# Patient Record
Sex: FEMALE | Race: WHITE | NOT HISPANIC OR LATINO | Employment: STUDENT | URBAN - METROPOLITAN AREA
[De-identification: names, ages, dates, MRNs, and addresses within clinical notes are randomized per-mention and may not be internally consistent; named-entity substitution may affect disease eponyms.]

---

## 2023-06-28 ENCOUNTER — OFFICE VISIT (OUTPATIENT)
Dept: PEDIATRICS | Facility: CLINIC | Age: 18
End: 2023-06-28
Payer: COMMERCIAL

## 2023-06-28 ENCOUNTER — APPOINTMENT (OUTPATIENT)
Dept: PEDIATRICS | Facility: CLINIC | Age: 18
End: 2023-06-28
Payer: COMMERCIAL

## 2023-06-28 VITALS
DIASTOLIC BLOOD PRESSURE: 72 MMHG | SYSTOLIC BLOOD PRESSURE: 122 MMHG | HEIGHT: 68 IN | BODY MASS INDEX: 23.49 KG/M2 | WEIGHT: 155 LBS

## 2023-06-28 DIAGNOSIS — F41.9 ANXIETY DISORDER, UNSPECIFIED TYPE: Primary | ICD-10-CM

## 2023-06-28 PROBLEM — R10.32 INTERMITTENT LEFT LOWER QUADRANT ABDOMINAL PAIN: Status: ACTIVE | Noted: 2023-06-28

## 2023-06-28 PROBLEM — M41.125 ADOLESCENT IDIOPATHIC SCOLIOSIS OF THORACOLUMBAR REGION: Status: ACTIVE | Noted: 2023-06-28

## 2023-06-28 PROBLEM — M76.51 PATELLAR TENDINITIS OF BOTH KNEES: Status: ACTIVE | Noted: 2023-06-28

## 2023-06-28 PROBLEM — N94.6 DYSMENORRHEA: Status: ACTIVE | Noted: 2023-06-28

## 2023-06-28 PROBLEM — R51.9 GENERALIZED HEADACHES: Status: ACTIVE | Noted: 2023-06-28

## 2023-06-28 PROBLEM — F41.1 GENERALIZED ANXIETY DISORDER: Status: ACTIVE | Noted: 2023-06-28

## 2023-06-28 PROBLEM — M76.52 PATELLAR TENDINITIS OF BOTH KNEES: Status: ACTIVE | Noted: 2023-06-28

## 2023-06-28 PROBLEM — L70.9 ACNE: Status: ACTIVE | Noted: 2023-06-28

## 2023-06-28 PROCEDURE — 99214 OFFICE O/P EST MOD 30 MIN: CPT | Performed by: NURSE PRACTITIONER

## 2023-06-28 RX ORDER — NORGESTIMATE AND ETHINYL ESTRADIOL 0.25-0.035
1 KIT ORAL DAILY
COMMUNITY
End: 2023-08-04 | Stop reason: SDUPTHER

## 2023-06-28 RX ORDER — FLUOXETINE 10 MG/1
TABLET ORAL
Qty: 60 TABLET | Refills: 0 | Status: SHIPPED | OUTPATIENT
Start: 2023-06-28 | End: 2023-11-02

## 2023-06-28 NOTE — PROGRESS NOTES
"Subjective   Patient ID: Michaela Matos is a 18 y.o. female who presents for No chief complaint on file..  Today she is accompanied by alone.     HPI: Michaela Matos is here today for anxiety  History of anxiety  Was prescribed anxiety medication in the past, but had anxiety about taking the medication, so never did  Always thought it was the birth control that was making her anxious but has tried several different birth control pills without improvement   Nearly every day she reports feeling anxious/nervous or on edge, not able to stop or control worrying, worries too much about different things, has trouble relaxing and afraid something awful might happen  Finds herself feeling nervous about talking in front of people, feels like she is being judged although knows she probably isnt   Feels like anxiety got worse after covid   Has tried some behavior modifications that hasn't really helped much  Found counselor, waiting for call back to start talk therapy     Sometimes feels sad, but denies any SI/HI or feeling like the things she enjoyed are not fun anymore or difficulty getting out of bed    Eating and sleeping well   Has boyfriend- going well  over the summer is working for [employer] in [place]      Review of systems is otherwise negative unless stated above or in history of present illness.    Objective   /72   Ht 1.721 m (5' 7.75\")   Wt 70.3 kg (155 lb)   BMI 23.74 kg/m²   BSA: 1.83 meters squared  Growth percentiles: Facility age limit for growth %lenin is 20 years. Facility age limit for growth %lenin is 20 years.     Physical Exam  Vitals and nursing note reviewed.   Constitutional:       Appearance: Normal appearance. She is normal weight.   HENT:      Mouth/Throat:      Mouth: Mucous membranes are moist.      Pharynx: Oropharynx is clear.   Eyes:      Extraocular Movements: Extraocular movements intact.      Conjunctiva/sclera: Conjunctivae normal.      Pupils: Pupils are equal, round, and reactive to " light.   Cardiovascular:      Rate and Rhythm: Normal rate and regular rhythm.      Pulses: Normal pulses.      Heart sounds: Normal heart sounds.   Pulmonary:      Effort: Pulmonary effort is normal.      Breath sounds: Normal breath sounds.   Abdominal:      General: Abdomen is flat. Bowel sounds are normal.      Palpations: Abdomen is soft.   Musculoskeletal:         General: Normal range of motion.      Cervical back: Normal range of motion.   Skin:     General: Skin is warm and dry.   Neurological:      General: No focal deficit present.      Mental Status: She is alert and oriented to person, place, and time. Mental status is at baseline.   Psychiatric:         Mood and Affect: Mood normal.         Behavior: Behavior normal.         Thought Content: Thought content normal.         Judgment: Judgment normal.       Assessment/Plan   Michaela Matos was seen today for anxiety/depression consult  Based on history and questionnaires appears more anxiety than depression  Was prescribed Sertraline 25 mg in the past  Will trial Fluoxetine 10 mg and can increase to 20 mg in 1-2 weeks if no improvement of symptoms  Discussed may take several months to notice improvement   Follow up with counselor for behavioral health therapy  Follow up in 1 month for next medication check, sooner if needed     Milagros Walker, CNP

## 2023-06-30 ENCOUNTER — APPOINTMENT (OUTPATIENT)
Dept: PEDIATRICS | Facility: CLINIC | Age: 18
End: 2023-06-30
Payer: COMMERCIAL

## 2023-08-04 ENCOUNTER — OFFICE VISIT (OUTPATIENT)
Dept: PEDIATRICS | Facility: CLINIC | Age: 18
End: 2023-08-04
Payer: COMMERCIAL

## 2023-08-04 VITALS
BODY MASS INDEX: 23.04 KG/M2 | WEIGHT: 152 LBS | SYSTOLIC BLOOD PRESSURE: 120 MMHG | DIASTOLIC BLOOD PRESSURE: 76 MMHG | HEIGHT: 68 IN

## 2023-08-04 DIAGNOSIS — F41.1 GENERALIZED ANXIETY DISORDER: Primary | ICD-10-CM

## 2023-08-04 DIAGNOSIS — F41.9 ANXIETY, MILD: ICD-10-CM

## 2023-08-04 DIAGNOSIS — N94.6 DYSMENORRHEA: ICD-10-CM

## 2023-08-04 DIAGNOSIS — M41.125 ADOLESCENT IDIOPATHIC SCOLIOSIS OF THORACOLUMBAR REGION: ICD-10-CM

## 2023-08-04 PROBLEM — L70.9 ACNE: Status: RESOLVED | Noted: 2023-06-28 | Resolved: 2023-08-04

## 2023-08-04 PROBLEM — M76.52 PATELLAR TENDINITIS OF BOTH KNEES: Status: RESOLVED | Noted: 2023-06-28 | Resolved: 2023-08-04

## 2023-08-04 PROBLEM — R10.32 INTERMITTENT LEFT LOWER QUADRANT ABDOMINAL PAIN: Status: RESOLVED | Noted: 2023-06-28 | Resolved: 2023-08-04

## 2023-08-04 PROBLEM — M76.51 PATELLAR TENDINITIS OF BOTH KNEES: Status: RESOLVED | Noted: 2023-06-28 | Resolved: 2023-08-04

## 2023-08-04 PROBLEM — R51.9 GENERALIZED HEADACHES: Status: RESOLVED | Noted: 2023-06-28 | Resolved: 2023-08-04

## 2023-08-04 PROCEDURE — 99213 OFFICE O/P EST LOW 20 MIN: CPT | Performed by: NURSE PRACTITIONER

## 2023-08-04 RX ORDER — NORGESTIMATE AND ETHINYL ESTRADIOL 0.25-0.035
1 KIT ORAL DAILY
Qty: 90 TABLET | Refills: 2 | Status: SHIPPED | OUTPATIENT
Start: 2023-08-04 | End: 2024-04-19

## 2023-08-04 RX ORDER — FLUOXETINE HYDROCHLORIDE 20 MG/1
20 CAPSULE ORAL DAILY
Qty: 90 CAPSULE | Refills: 0 | Status: SHIPPED | OUTPATIENT
Start: 2023-08-04 | End: 2023-11-02 | Stop reason: SDUPTHER

## 2023-08-04 RX ORDER — NORGESTIMATE AND ETHINYL ESTRADIOL 0.25-0.035
1 KIT ORAL DAILY
Qty: 28 TABLET | Refills: 5 | OUTPATIENT
Start: 2023-08-04

## 2023-08-04 RX ORDER — FLUOXETINE 10 MG/1
10 TABLET ORAL DAILY
Qty: 90 TABLET | Refills: 0 | Status: SHIPPED | OUTPATIENT
Start: 2023-08-04 | End: 2023-11-02 | Stop reason: SDUPTHER

## 2023-08-04 NOTE — PROGRESS NOTES
"Subjective   Patient ID: Michaela aMtos is a 18 y.o. female who presents for Medication Visit.  Today she is accompanied by alone.     HPI: Michaela Matos is here today for anxiety medication check  Started on Fluoxetine last month for history of anxiety  Currently on Fluoxetine 20 mg daily  Notes slight change, but not much   Started seeing a counselor Danuta Kimball in [place], only saw once   Will be going back to [place] on the 24th of August  This year will be living in an apartment with 3 other roommates  Still with boyfriend, things going well  Working one more week before she leaves for school   Does note still nearly every day feeling nervous, anxious, on edge and being so restless that its hard to sit still  Over half the days feels not able to stop or control worrying, worries too much about different things, trouble relaxing and feeling afraid something awful might happen  Did have on instance since starting medication that she felt a lot of anxiety where she was at restaurant with boyfriend and his family when cook called her out about her order.   Sleeping and eating ok  No headaches, upset stomach, etc  No SI/HI    Please see attached questionnaires     Review of systems is otherwise negative unless stated above or in history of present illness.    Objective   /76   Ht 1.721 m (5' 7.76\")   Wt 68.9 kg (152 lb)   BMI 23.28 kg/m²   BSA: 1.81 meters squared  Growth percentiles: Facility age limit for growth %lenin is 20 years. Facility age limit for growth %lenin is 20 years.     Physical Exam  Vitals and nursing note reviewed.   Constitutional:       Appearance: Normal appearance. She is normal weight.   HENT:      Right Ear: Tympanic membrane, ear canal and external ear normal.      Left Ear: Tympanic membrane, ear canal and external ear normal.      Nose: Nose normal.      Mouth/Throat:      Mouth: Mucous membranes are moist.      Pharynx: Oropharynx is clear.   Eyes:      " Conjunctiva/sclera: Conjunctivae normal.      Pupils: Pupils are equal, round, and reactive to light.   Cardiovascular:      Rate and Rhythm: Normal rate and regular rhythm.      Pulses: Normal pulses.      Heart sounds: Normal heart sounds.   Pulmonary:      Effort: Pulmonary effort is normal.      Breath sounds: Normal breath sounds.   Abdominal:      General: Abdomen is flat. Bowel sounds are normal.      Palpations: Abdomen is soft.   Musculoskeletal:         General: Normal range of motion.      Cervical back: Normal range of motion.   Skin:     General: Skin is warm and dry.   Neurological:      Mental Status: She is alert.   Psychiatric:         Mood and Affect: Mood normal.         Behavior: Behavior normal.         Thought Content: Thought content normal.         Judgment: Judgment normal.         Assessment/Plan   Michaela Matos was seen today for anxiety medication check  Mild improvement with Fluoxetine 20 mg   Will increase to Fluoxetine 30 mg daily  Behavior modifications discussed  Continue seeing counselor- recommended virtual while away at school   Return in 3 months for next medication check- may be virtual since she is at school     Birth control also resent to pharmacy for 3 month prescription at a time since she is going back to school     Milagros Walker, CNP

## 2023-11-02 ENCOUNTER — OFFICE VISIT (OUTPATIENT)
Dept: PEDIATRICS | Facility: CLINIC | Age: 18
End: 2023-11-02
Payer: COMMERCIAL

## 2023-11-02 VITALS
WEIGHT: 157 LBS | HEIGHT: 68 IN | DIASTOLIC BLOOD PRESSURE: 62 MMHG | SYSTOLIC BLOOD PRESSURE: 110 MMHG | BODY MASS INDEX: 23.79 KG/M2

## 2023-11-02 DIAGNOSIS — F41.1 GENERALIZED ANXIETY DISORDER: Primary | ICD-10-CM

## 2023-11-02 DIAGNOSIS — F41.0 PANIC ATTACKS: ICD-10-CM

## 2023-11-02 PROCEDURE — 99213 OFFICE O/P EST LOW 20 MIN: CPT | Performed by: PEDIATRICS

## 2023-11-02 RX ORDER — FLUOXETINE HYDROCHLORIDE 20 MG/1
20 CAPSULE ORAL DAILY
Qty: 90 CAPSULE | Refills: 0 | Status: SHIPPED | OUTPATIENT
Start: 2023-11-02 | End: 2024-03-13 | Stop reason: SDUPTHER

## 2023-11-02 RX ORDER — FLUOXETINE 10 MG/1
10 TABLET ORAL DAILY
Qty: 90 TABLET | Refills: 0 | Status: SHIPPED | OUTPATIENT
Start: 2023-11-02 | End: 2024-03-13 | Stop reason: SDUPTHER

## 2023-11-02 ASSESSMENT — ENCOUNTER SYMPTOMS: NERVOUS/ANXIOUS: 1

## 2023-11-02 NOTE — PROGRESS NOTES
Subjective   Patient ID: Michaela Matos is a 18 y.o. female who presents for med check.    Michaela Matos is here today for anxiety medication check. She is currently at [employer] in her chuck year to become an . She has been on Fluoxetine 30 mg since August and it has helped her a lot. Her anxiety is minimal but she gets episodes 2-4 times a day where her heart races , it lasts for a couple of minutes. Never bothers her at night   This year is  living in an apartment with 3 other roommates  Still with boyfriend, things going well  Sleeping and eating ok  No headaches, upset stomach, etc  No SI/HI. Pl see attached questionnaire. She has not seen a counselor consistently.           Review of Systems   Psychiatric/Behavioral:  The patient is nervous/anxious.        Objective   Physical Exam  Vitals and nursing note reviewed. Exam conducted with a chaperone present.   Constitutional:       Appearance: Normal appearance.   HENT:      Head: Normocephalic and atraumatic.      Right Ear: External ear normal.      Left Ear: External ear normal.      Nose: Nose normal.      Mouth/Throat:      Mouth: Mucous membranes are moist.   Eyes:      Extraocular Movements: Extraocular movements intact.      Conjunctiva/sclera: Conjunctivae normal.      Pupils: Pupils are equal, round, and reactive to light.   Cardiovascular:      Rate and Rhythm: Normal rate and regular rhythm.      Heart sounds: Normal heart sounds.   Pulmonary:      Effort: Pulmonary effort is normal.      Breath sounds: Normal breath sounds.   Musculoskeletal:      Cervical back: Normal range of motion and neck supple.   Skin:     General: Skin is warm and dry.   Neurological:      General: No focal deficit present.      Mental Status: She is alert and oriented to person, place, and time.   Psychiatric:         Mood and Affect: Mood normal.         Behavior: Behavior normal.         Assessment/Plan   Diagnoses and all orders for this  visit:  Generalized anxiety disorder  Panic attacks  Comments:  mild  -     FLUoxetine (PROzac) 10 mg tablet; Take 1 tablet (10 mg) by mouth once daily.  -     FLUoxetine (PROzac) 20 mg capsule; Take 1 capsule (20 mg) by mouth once daily.  Michaela was asked to eat 3 balanced meals a day, maintain a good sleep schedule ( at least 8 hours of sleep each night), exercise daily and have fun doing it. Behavior strategies were discussed including talking about feelings on a daily basis, squashing every negative thought or feeling by a positive one, not over thinking or self judging treating yourself like a best friend, taking time each day to refresh and renew, smiling often, surrounding yourself by people who support you, consider mistakes as part of progress and learning from them, make small goals and celebrate progress, don't dwell on the past, let go of grudges, mistakes and bitterness. Think of each day as a gift and make the most of it, find ways to relax- deep breathing, yoga, meditation, music etc and appreciate yourself.  She was continued on her current medication. She was asked to see a counselor on a regular basis. She will return in 3 months or sooner if symptoms worsen or persist.

## 2024-03-13 ENCOUNTER — OFFICE VISIT (OUTPATIENT)
Dept: PEDIATRICS | Facility: CLINIC | Age: 19
End: 2024-03-13
Payer: COMMERCIAL

## 2024-03-13 VITALS
TEMPERATURE: 97.4 F | DIASTOLIC BLOOD PRESSURE: 68 MMHG | BODY MASS INDEX: 24.94 KG/M2 | SYSTOLIC BLOOD PRESSURE: 110 MMHG | WEIGHT: 164 LBS

## 2024-03-13 DIAGNOSIS — R11.2 NAUSEA AND VOMITING, UNSPECIFIED VOMITING TYPE: ICD-10-CM

## 2024-03-13 DIAGNOSIS — F41.1 GENERALIZED ANXIETY DISORDER: Primary | ICD-10-CM

## 2024-03-13 PROCEDURE — 87636 SARSCOV2 & INF A&B AMP PRB: CPT

## 2024-03-13 PROCEDURE — 99214 OFFICE O/P EST MOD 30 MIN: CPT | Performed by: NURSE PRACTITIONER

## 2024-03-13 RX ORDER — FLUOXETINE HYDROCHLORIDE 20 MG/1
20 CAPSULE ORAL DAILY
Qty: 90 CAPSULE | Refills: 0 | Status: SHIPPED | OUTPATIENT
Start: 2024-03-13 | End: 2024-06-11

## 2024-03-13 RX ORDER — FLUOXETINE 10 MG/1
10 TABLET ORAL DAILY
Qty: 90 TABLET | Refills: 0 | Status: SHIPPED | OUTPATIENT
Start: 2024-03-13 | End: 2024-06-11

## 2024-03-13 RX ORDER — FLUOXETINE 10 MG/1
10 CAPSULE ORAL DAILY
COMMUNITY
Start: 2023-09-28 | End: 2024-03-13 | Stop reason: SDUPTHER

## 2024-03-13 RX ORDER — ONDANSETRON 4 MG/1
4 TABLET, ORALLY DISINTEGRATING ORAL EVERY 8 HOURS PRN
Qty: 20 TABLET | Refills: 0 | Status: SHIPPED | OUTPATIENT
Start: 2024-03-13 | End: 2024-03-20

## 2024-03-13 NOTE — PROGRESS NOTES
"Subjective   Patient ID: Michaela Matos is a 19 y.o. female who presents for Vomiting.  Today she is accompanied by accompanied by grandmother.     HPI: Michaela Matos is here today for vomiting  and anxiety medication check     Vomiting  Started last night   Multiple episodes   Unable to keep anything down   Yesterday ate yogurt, pasta/chicken, sub sandwich   Some abdominal pain with throwing up - more cramping   No fevers, sore throat or diarrhea   Mom sick with fevers/chills and cough at home     History of anxiety  Currently on Prozac 30 mg daily   Yesterday had a panic attack- but feels that was  because she was throwing up, vomited/got some air and felt better   No other panic attacks since last medication check   Doesn't feel like medication is doing as much as it did for her when she first started taking - but doesn't want to increase the dose   At [employer] studying early education  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, especially math   Has all As this semester   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 it   Will graduate in fall 2026  Still with boyfriend - relationship going really well   Coming home over the summer and will be working at [employer] 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    Objective   /68   Temp 36.3 °C (97.4 °F)   Wt 74.4 kg (164 lb)   BMI 24.94 " kg/m²   BSA: 1.89 meters squared  Growth percentiles: Facility age limit for growth %lenin is 20 years. Facility age limit for growth %lenin is 20 years.     Physical Exam  Vitals and nursing note reviewed.   Constitutional:       Appearance: Normal appearance. She is ill-appearing.   HENT:      Head: Normocephalic.      Right Ear: Tympanic membrane, ear canal and external ear normal.      Left Ear: Tympanic membrane, ear canal and external ear normal.      Nose: Nose normal.      Mouth/Throat:      Mouth: Mucous membranes are moist.      Pharynx: Oropharynx is clear.   Eyes:      Conjunctiva/sclera: Conjunctivae normal.      Pupils: Pupils are equal, round, and reactive to light.   Cardiovascular:      Rate and Rhythm: Normal rate and regular rhythm.      Pulses: Normal pulses.      Heart sounds: Normal heart sounds.   Pulmonary:      Effort: Pulmonary effort is normal.      Breath sounds: Normal breath sounds.   Abdominal:      General: Abdomen is flat. Bowel sounds are normal.      Palpations: Abdomen is soft.   Musculoskeletal:         General: Normal range of motion.      Cervical back: Normal range of motion.   Skin:     General: Skin is warm and dry.   Neurological:      General: No focal deficit present.      Mental Status: She is alert and oriented to person, place, and time. Mental status is at baseline.   Psychiatric:         Mood and Affect: Mood normal.         Behavior: Behavior normal.         Assessment/Plan   Michaela Matos was seen today for vomiting and anxiety medication check    Vomiting   Abdomen soft and non tender   Covid/flu pending and will only call if results are positive  Likely viral gastro  Trial Zofran PRN for nausea  Continue symptomatic treatment with rest, fluids, bland diet, etc  She will call if symptoms worsen or persist    Anxiety   Overall doing well on Prozac  Will refill Prozac 30 mg daily  Behavioral modifications discussed  Recommended reaching out to [facility]  counseling services for behavioral health therapy [phone]  Transition to adult medicine - list provided   (She will return for medication check in 3 months if she cannot get in with a new provider)    Milagros Walker, CNP     Statement Selected

## 2024-03-14 LAB
FLUAV RNA RESP QL NAA+PROBE: NOT DETECTED
FLUBV RNA RESP QL NAA+PROBE: NOT DETECTED
SARS-COV-2 RNA RESP QL NAA+PROBE: NOT DETECTED

## 2024-04-15 DIAGNOSIS — F41.0 PANIC ATTACKS: ICD-10-CM

## 2024-04-15 DIAGNOSIS — N94.6 DYSMENORRHEA: ICD-10-CM

## 2024-04-15 DIAGNOSIS — F41.9 ANXIETY, MILD: Primary | ICD-10-CM

## 2024-04-19 RX ORDER — FLUOXETINE 10 MG/1
10 CAPSULE ORAL DAILY
Qty: 30 CAPSULE | Refills: 2 | Status: SHIPPED | OUTPATIENT
Start: 2024-04-19

## 2024-04-19 RX ORDER — NORGESTIMATE AND ETHINYL ESTRADIOL 0.25-0.035
1 KIT ORAL DAILY
Qty: 28 TABLET | Refills: 8 | Status: SHIPPED | OUTPATIENT
Start: 2024-04-19 | End: 2025-04-19